# Patient Record
(demographics unavailable — no encounter records)

---

## 2025-07-16 NOTE — PROCEDURE
[FreeTextEntry1] : R temporal art biopsy [FreeTextEntry2] : r/o GCA [FreeTextEntry3] :  The patient was met in the pre-procedure holding area of the office where informed consent was obtained and verified. He then entered the procedure room and laid in the supine position. All pressure points were appropriately padded. His hair was carefully clipped and cleaned away off the temporal artery. The right temporal artery was palpable and marked with a sterile marking pen. The patient was then prepped and draped in the usual sterile fashion. A time-out was performed according to standard parameters. 1% plain lidocaine was injected over the left temporal artery. A 2cm incision was then made directly over the left temporal artery. Skin flaps were created on both sides of the incision. The wound was deepened through the subcutaneous tissue using electrocautery. A self-retaining retractor was placed within the incision. The fascia over the right temporal artery was sharply divided. The right temporal artery was then circumferentially dissected free from surrounding tissue and completely mobilized. It was then ligated proximally and distally. Its small branches were also ligated and then the artery was divided adjacent to all ties. It was placed within a specimen cup with formalin and sent as permanent specimen. Hemostasis was then achieved with Bovie electrocautery. The wound was then closed in two layers using a 3-0 Vicryl through the deep dermis and a 4-0 Monocryl in the subcuticular layer. A sterile dressing was applied. All instrument and sponge counts were correct. I was present and performed every aspect of this operation.

## 2025-07-26 NOTE — PHYSICAL EXAM
[Alert] : alert [Oriented to Person] : oriented to person [Oriented to Place] : oriented to place [Oriented to Time] : oriented to time [Calm] : calm [de-identified] : NAD [de-identified] : NCAT [de-identified] : unlabored breathing [FreeTextEntry1] : right temporal incision c/d/i, healing well no bleeding or drainage

## 2025-07-26 NOTE — END OF VISIT
Last visit: 5/15/23  Upcoming visit: 8/14/23  Last refill zolpidem: 4/24/23 #30   Controlled substance agreement: 2/20/23    Urine drug screen: 2/20/23  Consistent OR Inconsistent inconsistent  Reconciled yes OR no: yes    Please advise  
[Time Spent: ___ minutes] : I have spent [unfilled] minutes of time on the encounter which excludes teaching and separately reported services.

## 2025-07-26 NOTE — ASSESSMENT
[FreeTextEntry1] : Pt is s/p right temporal artery biopsy 7/16/25. Biopsy is negative for GCA.  Plan Sutures removed in the office Incision site care. Can wash hair. No hard scrubbing. Pat dry. Avoid shaving the area until incision is fully healed Follow up with rheumatology Return to office as needed

## 2025-07-26 NOTE — HISTORY OF PRESENT ILLNESS
[FreeTextEntry1] : 77 yo male is s/p right temporal artery biopsy on 7/16/25. Incision is healing well. No bleeding or drainage. The biopsy is negative for GCA. He is taking prednisone. Has not made follow up appointment with rheumatology yet.